# Patient Record
Sex: MALE | Race: WHITE | NOT HISPANIC OR LATINO | URBAN - NONMETROPOLITAN AREA
[De-identification: names, ages, dates, MRNs, and addresses within clinical notes are randomized per-mention and may not be internally consistent; named-entity substitution may affect disease eponyms.]

---

## 2019-01-21 ENCOUNTER — IMPORTED ENCOUNTER (OUTPATIENT)
Dept: URBAN - NONMETROPOLITAN AREA CLINIC 1 | Facility: CLINIC | Age: 64
End: 2019-01-21

## 2019-01-21 PROBLEM — H52.223: Noted: 2019-01-21

## 2019-01-21 PROBLEM — H25.13: Noted: 2019-01-21

## 2019-01-21 PROCEDURE — S0620 ROUTINE OPHTHALMOLOGICAL EXA: HCPCS

## 2019-01-21 NOTE — PATIENT DISCUSSION
Astigmatism-Discussed diagnosis with patient. Presbyopia-Discussed diagnosis with patient. Updated spec Rx given. Recommend lens that will provide comfort as well as protect safety and health of eyes.

## 2021-08-17 NOTE — PATIENT DISCUSSION
OHTN, OU: IOP STILL SLIGHTLY ELEVATED BUT TESTING LOOKS GOOD. WILL WATCH FOR NOW WITHOUT PUTTING ON DROPS.  WILL SEE BACK FOR YEARLY EXAM

## 2022-04-09 ASSESSMENT — TONOMETRY
OD_IOP_MMHG: 17
OS_IOP_MMHG: 17

## 2022-04-09 ASSESSMENT — VISUAL ACUITY
OD_CC: J1
OU_SC: 20/40
OD_SC: 20/50
OS_CC: J1
OS_SC: 20/40
